# Patient Record
(demographics unavailable — no encounter records)

---

## 2025-07-09 NOTE — HISTORY OF PRESENT ILLNESS
[de-identified] : 4-year-old male presents for initial evaluation for: hearing loss, hearing test, speech delay. Accompanied by guardian.  Reports that his biological mom has significant hearing loss. States that he is having trouble hearing.  No ear infections. Here today for checkup. Also has speech delay and going to speech therapy. No further complaints or concerns.

## 2025-07-09 NOTE — REASON FOR VISIT
[Initial Evaluation] : an initial evaluation for [FreeTextEntry2] : hearing loss, hearing test, speech delay.

## 2025-07-09 NOTE — HISTORY OF PRESENT ILLNESS
[de-identified] : 4-year-old male presents for initial evaluation for: hearing loss, hearing test, speech delay. Accompanied by guardian.  Reports that his biological mom has significant hearing loss. States that he is having trouble hearing.  No ear infections. Here today for checkup. Also has speech delay and going to speech therapy. No further complaints or concerns.